# Patient Record
Sex: MALE | Race: OTHER | HISPANIC OR LATINO | URBAN - METROPOLITAN AREA
[De-identification: names, ages, dates, MRNs, and addresses within clinical notes are randomized per-mention and may not be internally consistent; named-entity substitution may affect disease eponyms.]

---
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
## 2022-01-13 NOTE — ED PEDIATRIC NURSE NOTE - NS CRAFFT PART A VALIDATION ALCOHOL
Anticipated Starting Dosage (Optional): 30mg Daily Patient answered NO to all of the above 3 questions... Patient Reported Weight (Optional - Include Units): 95 Detail Level: Zone

## 2024-11-20 NOTE — ED PEDIATRIC TRIAGE NOTE - TEMP(CELSIUS)
36.9